# Patient Record
(demographics unavailable — no encounter records)

---

## 2025-01-29 NOTE — ASSESSMENT
[FreeTextEntry1] : The patient was advised of the diagnosis. The natural history of the pathology was explained in full to the patient in layman's terms. All questions were answered. The risks and benefits of surgical and non-surgical treatment alternatives were explained in full to the patient.  We reviewed the pathology and treatment options- patient aware that options include observation, aspiration, surgery- aspiration with 50/50 chance of resolution, surgery usually 95-97% effective. Today the patient is amenable to aspiration,  Understands that with volar masses chance of arterial injury which may result in ecchymosis and occasional hematoma.  After sterile prep, aspirated scant jelly like substance consistent with mucous cyst.  Right middle finger mucous cyst aspirated.  RTO PRN

## 2025-01-29 NOTE — IMAGING
[de-identified] : right thumb skin intact dorsal mass at IPj [id number], nonmobile, NT Right middle finger with TTP over the dorsal P2 farom NVID  x-rays 3 views of the right thumb and right middle finger- thumb with IPj djd and osteophyte. no acute bony pathology of middle finger

## 2025-01-29 NOTE — HISTORY OF PRESENT ILLNESS
[Dull/Aching] : dull/aching [Localized] : localized [Tightness] : tightness [de-identified] : 1/29/25 right thumb with dorsal mas over the IPj also with R MF pain- improved c/w prior, but still there with motion  9/10/2024: RHD 56 yo female here with right middle finger pain x 1 day. No hx of trauma. Pt has noted intermittent tingling x 2 yrs to the right hand.  RHD occ: principal PMH: HTN. Allergies:NKDA  [FreeTextEntry5] : RT middle finger paoin developed week and thumb pain that developed 6 weeks ago

## 2025-01-29 NOTE — HISTORY OF PRESENT ILLNESS
[Dull/Aching] : dull/aching [Localized] : localized [Tightness] : tightness [de-identified] : 1/29/25 right thumb with dorsal mas over the IPj also with R MF pain- improved c/w prior, but still there with motion  9/10/2024: RHD 58 yo female here with right middle finger pain x 1 day. No hx of trauma. Pt has noted intermittent tingling x 2 yrs to the right hand.  RHD occ: principal PMH: HTN. Allergies:NKDA  [FreeTextEntry5] : RT middle finger paoin developed week and thumb pain that developed 6 weeks ago

## 2025-01-29 NOTE — IMAGING
[de-identified] : right thumb skin intact dorsal mass at IPj [id number], nonmobile, NT Right middle finger with TTP over the dorsal P2 farom NVID  x-rays 3 views of the right thumb and right middle finger- thumb with IPj djd and osteophyte. no acute bony pathology of middle finger

## 2025-03-18 NOTE — HISTORY OF PRESENT ILLNESS
[Dull/Aching] : dull/aching [Localized] : localized [Tightness] : tightness [de-identified] : 3/18/25:thumb mass annoying, painful middle finger pain at the dip  1/29/25 right thumb with dorsal mas over the IPj also with R MF pain- improved c/w prior, but still there with motion  9/10/2024: RHD 58 yo female here with right middle finger pain x 1 day. No hx of trauma. Pt has noted intermittent tingling x 2 yrs to the right hand.  RHD occ: principal PMH: HTN. Allergies:NKDA  [FreeTextEntry5] : RT middle finger paoin developed week and thumb pain that developed 6 weeks ago

## 2025-03-18 NOTE — IMAGING
[de-identified] : right thumb skin intact dorsal mass at IPj [id number], nonmobile, NT farom NVID   Left shoulder minimal tenderness anterior shoulder - posterior lift off skin intact nvid - empty can - speeds

## 2025-03-18 NOTE — ASSESSMENT
[FreeTextEntry1] : We reviewed the pathology and treatment options- patient aware that options include observation, aspiration, surgery- aspiration with 50/50 chance of resolution, surgery usually 95-97% effective, although some studies indicate that recurrence may be as high as 25%. Today the patient is amenable to surgical excision,  Understands that with volar masses chance of arterial injury which may result in ecchymosis and occasional hematoma, recurrence, stiffness due to dissection to the joint capsule as well as general risks of surgery- bleeding, infection, injury to nerves, vessels or tendons, need for future surgery, loss of limb, loss of life. all questions answered and patient agrees to the surgical plan.  post op protocol and expectations were reviewed.  rto after surgery

## 2025-05-27 NOTE — HISTORY OF PRESENT ILLNESS
[Rectal Prolapse] : moderate [Unable To Restrain Bowel Movement] : mild [Feelings Of Urinary Urgency] : no [x1] : nocturia once nightly [Urinary Tract Infection] : mild [] : no [Constipation Obstructed Defecation] : mild [Stool Visible Blood] : no [Incomplete Emptying Of Stool] : no [de-identified] : ~1 time every 1-2 months, not bothersome [FreeTextEntry1] :  PSH: appendectomy, cholecystectomy
